# Patient Record
Sex: MALE | Race: WHITE | NOT HISPANIC OR LATINO | Employment: PART TIME | URBAN - NONMETROPOLITAN AREA
[De-identification: names, ages, dates, MRNs, and addresses within clinical notes are randomized per-mention and may not be internally consistent; named-entity substitution may affect disease eponyms.]

---

## 2017-07-22 ENCOUNTER — OFFICE VISIT (OUTPATIENT)
Dept: URGENT CARE | Facility: CLINIC | Age: 32
End: 2017-07-22

## 2017-07-22 PROCEDURE — 99203 OFFICE O/P NEW LOW 30 MIN: CPT

## 2024-10-22 ENCOUNTER — OFFICE VISIT (OUTPATIENT)
Dept: URGENT CARE | Facility: CLINIC | Age: 39
End: 2024-10-22
Payer: COMMERCIAL

## 2024-10-22 VITALS
SYSTOLIC BLOOD PRESSURE: 139 MMHG | RESPIRATION RATE: 18 BRPM | OXYGEN SATURATION: 100 % | TEMPERATURE: 98.7 F | DIASTOLIC BLOOD PRESSURE: 93 MMHG | HEART RATE: 61 BPM

## 2024-10-22 DIAGNOSIS — K04.7 DENTAL INFECTION: Primary | ICD-10-CM

## 2024-10-22 PROCEDURE — S9083 URGENT CARE CENTER GLOBAL: HCPCS

## 2024-10-22 PROCEDURE — G0382 LEV 3 HOSP TYPE B ED VISIT: HCPCS

## 2024-10-22 RX ORDER — CHLORHEXIDINE GLUCONATE ORAL RINSE 1.2 MG/ML
15 SOLUTION DENTAL 2 TIMES DAILY
Qty: 120 ML | Refills: 0 | Status: SHIPPED | OUTPATIENT
Start: 2024-10-22

## 2024-10-22 RX ORDER — CLINDAMYCIN HYDROCHLORIDE 300 MG/1
300 CAPSULE ORAL 4 TIMES DAILY
Qty: 40 CAPSULE | Refills: 0 | Status: SHIPPED | OUTPATIENT
Start: 2024-10-22 | End: 2024-11-01

## 2024-10-22 NOTE — LETTER
October 22, 2024     Patient: Edward Lucas  YOB: 1985  Date of Visit: 10/22/2024      To Whom it May Concern:    Edward Lucas is under my professional care. Edward was seen in my office on 10/22/2024. Edward may return to work on 10/23/2024 .    If you have any questions or concerns, please don't hesitate to call.         Sincerely,          MISTY Morel        CC: No Recipients

## 2024-10-22 NOTE — PATIENT INSTRUCTIONS
You may take over the counter Tylenol (Acetaminophen) and/or Motrin (Ibuprofen) as needed, as directed on packaging.     Take the antibiotics 4x daily for the number of days ordered. DO NOT stop taking them even if you are feeling better    Peridex rinse twice daily until completed    In between the peridex rinses you can use half strength peroxide rinses to cleanse the mouth after eating    If pain or symptoms worsen you will need to go to the ER for further evaluation and treatment     Follow up with dentist ASAP

## 2024-10-22 NOTE — PROGRESS NOTES
[facility]        NAME: Edward Lucas is a 38 y.o. male  : 1985    MRN: [id number]  DATE: 2024  TIME: 2:26 PM    Assessment and Plan   Dental infection [K04.7]  1. Dental infection  clindamycin (CLEOCIN) 300 MG capsule    chlorhexidine (PERIDEX) 0.12 % solution        Patient was advised to follow-up with a dentist ASAP.  Patient is allergic to penicillin based antibiotics.  Placed on clindamycin and Peridex rinses.  Patient was educated on both.  If symptoms worsen he was advised to go to the emergency room.    Patient Instructions     You may take over the counter Tylenol (Acetaminophen) and/or Motrin (Ibuprofen) as needed, as directed on packaging.     Take the antibiotics 4x daily for the number of days ordered. DO NOT stop taking them even if you are feeling better    Peridex rinse twice daily until completed    In between the peridex rinses you can use half strength peroxide rinses to cleanse the mouth after eating    If pain or symptoms worsen you will need to go to the ER for further evaluation and treatment     Follow up with dentist ASAP  Follow up with PCP in 3-5 days.  Proceed to  ER if symptoms worsen.    If tests are performed, our office will contact you with results only if changes need to made to the care plan discussed with you at the visit. You can review your full results on [facility].    Chief Complaint     Chief Complaint   Patient presents with    Dental Pain     Front upper dental pain onset 2 days ago          History of Present Illness       38-year-old male patient presenting to this clinic with complaint of front upper dental pain with an onset of 2 days ago.  He denies injury or trauma.        Review of Systems   Review of Systems   HENT:  Positive for dental problem.          Current Medications       Current Outpatient Medications:     chlorhexidine (PERIDEX) 0.12 % solution, Apply 15 mL to the mouth or throat 2 (two) times a day, Disp: 120 mL,  Rfl: 0    clindamycin (CLEOCIN) 300 MG capsule, Take 1 capsule (300 mg total) by mouth 4 (four) times a day for 10 days, Disp: 40 capsule, Rfl: 0    Current Allergies     Allergies as of 10/22/2024 - Reviewed 10/22/2024   Allergen Reaction Noted    Amoxicillin Hives and Rash 07/12/2010            The following portions of the patient's history were reviewed and updated as appropriate: allergies, current medications, past family history, past medical history, past social history, past surgical history and problem list.     History reviewed. No pertinent past medical history.    Past Surgical History:   Procedure Laterality Date    TONSILLECTOMY         History reviewed. No pertinent family history.      Medications have been verified.        Objective   /93   Pulse 61   Temp 98.7 °F (37.1 °C)   Resp 18   SpO2 100%        Physical Exam     Physical Exam  Vitals and nursing note reviewed.   Constitutional:       Appearance: Normal appearance. He is obese.   HENT:      Head: Normocephalic and atraumatic.      Mouth/Throat:      Lips: Pink. No lesions.      Mouth: Mucous membranes are moist.      Dentition: Abnormal dentition. Dental tenderness, gingival swelling and dental caries present.      Tongue: No lesions. Tongue does not deviate from midline.      Palate: No mass and lesions.        Comments: Right upper tooth #3 as noted in diagram is the area of pain and tenderness; pt has extensive decay noted in mouth.   Cardiovascular:      Rate and Rhythm: Normal rate and regular rhythm.      Pulses: Normal pulses.      Heart sounds: Normal heart sounds.   Pulmonary:      Effort: Pulmonary effort is normal.      Breath sounds: Normal breath sounds.   Skin:     General: Skin is warm and dry.      Capillary Refill: Capillary refill takes less than 2 seconds.   Neurological:      Mental Status: He is alert.

## 2025-01-04 ENCOUNTER — OFFICE VISIT (OUTPATIENT)
Dept: URGENT CARE | Facility: CLINIC | Age: 40
End: 2025-01-04
Payer: COMMERCIAL

## 2025-01-04 VITALS
TEMPERATURE: 97.9 F | OXYGEN SATURATION: 99 % | HEART RATE: 72 BPM | RESPIRATION RATE: 18 BRPM | WEIGHT: 250 LBS | BODY MASS INDEX: 37.89 KG/M2 | DIASTOLIC BLOOD PRESSURE: 80 MMHG | SYSTOLIC BLOOD PRESSURE: 128 MMHG | HEIGHT: 68 IN

## 2025-01-04 DIAGNOSIS — B96.89 ACUTE BACTERIAL BRONCHITIS: Primary | ICD-10-CM

## 2025-01-04 DIAGNOSIS — J20.8 ACUTE BACTERIAL BRONCHITIS: Primary | ICD-10-CM

## 2025-01-04 PROCEDURE — S9083 URGENT CARE CENTER GLOBAL: HCPCS

## 2025-01-04 PROCEDURE — G0382 LEV 3 HOSP TYPE B ED VISIT: HCPCS

## 2025-01-04 RX ORDER — METHYLPREDNISOLONE 4 MG/1
TABLET ORAL
Qty: 1 EACH | Refills: 0 | Status: SHIPPED | OUTPATIENT
Start: 2025-01-04

## 2025-01-04 RX ORDER — BROMPHENIRAMINE MALEATE, PSEUDOEPHEDRINE HYDROCHLORIDE, AND DEXTROMETHORPHAN HYDROBROMIDE 2; 30; 10 MG/5ML; MG/5ML; MG/5ML
5 SYRUP ORAL 4 TIMES DAILY PRN
Qty: 120 ML | Refills: 0 | Status: SHIPPED | OUTPATIENT
Start: 2025-01-04

## 2025-01-04 RX ORDER — DOXYCYCLINE HYCLATE 100 MG
100 TABLET ORAL 2 TIMES DAILY
Qty: 10 TABLET | Refills: 0 | Status: SHIPPED | OUTPATIENT
Start: 2025-01-04 | End: 2025-01-09

## 2025-01-04 NOTE — LETTER
January 4, 2025     Patient: Edward Lucas  YOB: 1985  Date of Visit: 1/4/2025      To Whom it May Concern:    Edward Lucas is under my professional care. Edward was seen in my office on 1/4/2025. Edward may return to work on 1/7/2025 .    If you have any questions or concerns, please don't hesitate to call.         Sincerely,          MISTY Morel        CC: No Recipients

## 2025-01-04 NOTE — PROGRESS NOTES
[facility]        NAME: Edward Lucas is a 39 y.o. male  : 1985    MRN: [id number]  DATE: 2025  TIME: 1:38 PM    Assessment and Plan   Acute bacterial bronchitis [J20.8, B96.89]  1. Acute bacterial bronchitis  doxycycline hyclate (VIBRA-TABS) 100 mg tablet    methylPREDNISolone 4 MG tablet therapy pack    brompheniramine-pseudoephedrine-DM 30-2-10 MG/5ML syrup            Patient Instructions       Follow up with PCP in 3-5 days.  Proceed to  ER if symptoms worsen.    If tests are performed, our office will contact you with results only if changes need to made to the care plan discussed with you at the visit. You can review your full results on [facility].    Chief Complaint     Chief Complaint   Patient presents with    Sinusitis     Pain and pressure to upper sinuses, slight dizziness  Needs a note for work    Neck Pain    Cough     Foul tasting mucous, green in color    Fever     Most of the week, just broke          History of Present Illness       39-year-old male patient with no significant past medical history presents to this clinic with complaint of pain and pressure to the upper sinuses, slight dizziness, cough with foul tasting mucus which is green in color and a fever for most of the week which is not present today.  Patient missed work and will need a note for work.    Sinusitis  Associated symptoms include coughing, neck pain and sinus pressure.   Neck Pain   Associated symptoms include a fever.   Cough  Associated symptoms include a fever.   Fever  Associated symptoms include coughing, a fever and neck pain.       Review of Systems   Review of Systems   Constitutional:  Positive for fever.   HENT:  Positive for sinus pressure and sinus pain.    Respiratory:  Positive for cough.    Musculoskeletal:  Positive for neck pain.         Current Medications       Current Outpatient Medications:     brompheniramine-pseudoephedrine-DM 30-2-10 MG/5ML syrup, Take 5 mL by mouth  "4 (four) times a day as needed for congestion or cough, Disp: 120 mL, Rfl: 0    doxycycline hyclate (VIBRA-TABS) 100 mg tablet, Take 1 tablet (100 mg total) by mouth 2 (two) times a day for 5 days, Disp: 10 tablet, Rfl: 0    methylPREDNISolone 4 MG tablet therapy pack, Use as directed on package, Disp: 1 each, Rfl: 0    chlorhexidine (PERIDEX) 0.12 % solution, Apply 15 mL to the mouth or throat 2 (two) times a day (Patient not taking: Reported on 1/4/2025), Disp: 120 mL, Rfl: 0    Current Allergies     Allergies as of 01/04/2025 - Reviewed 01/04/2025   Allergen Reaction Noted    Amoxicillin Hives and Rash 07/12/2010            The following portions of the patient's history were reviewed and updated as appropriate: allergies, current medications, past family history, past medical history, past social history, past surgical history and problem list.     History reviewed. No pertinent past medical history.    Past Surgical History:   Procedure Laterality Date    ADENOIDECTOMY      TONSILLECTOMY         History reviewed. No pertinent family history.      Medications have been verified.        Objective   /80   Pulse 72   Temp 97.9 °F (36.6 °C)   Resp 18   Ht 5' 8\" (1.727 m)   Wt 113 kg (250 lb)   SpO2 99%   BMI 38.01 kg/m²        Physical Exam     Physical Exam  Vitals and nursing note reviewed.   Constitutional:       Appearance: Normal appearance. He is obese.   HENT:      Head: Normocephalic and atraumatic.      Right Ear: Ear canal and external ear normal. Tympanic membrane is injected.      Left Ear: Ear canal and external ear normal. Tympanic membrane is injected.      Ears:      Comments: Mild fluid behind bilateral TM     Nose: Congestion present.      Right Turbinates: Swollen.      Left Turbinates: Swollen.      Right Sinus: No maxillary sinus tenderness or frontal sinus tenderness.      Left Sinus: No maxillary sinus tenderness or frontal sinus tenderness.      Mouth/Throat:      Lips: Pink.     "  Mouth: Mucous membranes are moist.      Pharynx: Pharyngeal swelling and posterior oropharyngeal erythema present.      Comments: Tonsils surgically absent  Eyes:      Extraocular Movements: Extraocular movements intact.      Conjunctiva/sclera: Conjunctivae normal.      Pupils: Pupils are equal, round, and reactive to light.   Cardiovascular:      Rate and Rhythm: Normal rate and regular rhythm.      Pulses: Normal pulses.      Heart sounds: Normal heart sounds.   Pulmonary:      Effort: Pulmonary effort is normal.      Breath sounds: Normal breath sounds.      Comments: Loose frequent cough productive for thick green sputum  Musculoskeletal:      Cervical back: Normal range of motion and neck supple. No rigidity or tenderness.   Lymphadenopathy:      Cervical: Cervical adenopathy present.   Skin:     General: Skin is warm and dry.      Capillary Refill: Capillary refill takes less than 2 seconds.   Neurological:      Mental Status: He is alert.